# Patient Record
Sex: FEMALE | Race: WHITE | NOT HISPANIC OR LATINO
[De-identification: names, ages, dates, MRNs, and addresses within clinical notes are randomized per-mention and may not be internally consistent; named-entity substitution may affect disease eponyms.]

---

## 2017-03-07 ENCOUNTER — APPOINTMENT (OUTPATIENT)
Dept: ORTHOPEDIC SURGERY | Facility: CLINIC | Age: 72
End: 2017-03-07

## 2017-03-07 VITALS
HEIGHT: 64.5 IN | BODY MASS INDEX: 19.56 KG/M2 | DIASTOLIC BLOOD PRESSURE: 78 MMHG | SYSTOLIC BLOOD PRESSURE: 135 MMHG | HEART RATE: 89 BPM | WEIGHT: 116 LBS

## 2017-03-07 DIAGNOSIS — M16.11 UNILATERAL PRIMARY OSTEOARTHRITIS, RIGHT HIP: ICD-10-CM

## 2017-03-07 RX ORDER — DICLOFENAC SODIUM AND MISOPROSTOL 50; 200 MG/1; UG/1
50-200 TABLET, FILM COATED ORAL
Refills: 0 | Status: ACTIVE | COMMUNITY

## 2017-05-31 ENCOUNTER — INPATIENT (INPATIENT)
Facility: HOSPITAL | Age: 72
LOS: 1 days | Discharge: HOME | DRG: 395 | End: 2017-06-02
Attending: INTERNAL MEDICINE | Admitting: INTERNAL MEDICINE
Payer: COMMERCIAL

## 2017-05-31 VITALS
DIASTOLIC BLOOD PRESSURE: 56 MMHG | RESPIRATION RATE: 16 BRPM | SYSTOLIC BLOOD PRESSURE: 108 MMHG | HEART RATE: 83 BPM | OXYGEN SATURATION: 96 %

## 2017-05-31 LAB
ALBUMIN SERPL ELPH-MCNC: 3.8 G/DL — SIGNIFICANT CHANGE UP (ref 3.3–5)
ALP SERPL-CCNC: 52 U/L — SIGNIFICANT CHANGE UP (ref 40–120)
ALT FLD-CCNC: 24 U/L RC — SIGNIFICANT CHANGE UP (ref 10–45)
ANION GAP SERPL CALC-SCNC: 15 MMOL/L — SIGNIFICANT CHANGE UP (ref 5–17)
APTT BLD: 25.2 SEC — LOW (ref 27.5–37.4)
AST SERPL-CCNC: 31 U/L — SIGNIFICANT CHANGE UP (ref 10–40)
BASE EXCESS BLDV CALC-SCNC: 5.2 MMOL/L — HIGH (ref -2–2)
BASOPHILS # BLD AUTO: 0.1 K/UL — SIGNIFICANT CHANGE UP (ref 0–0.2)
BASOPHILS NFR BLD AUTO: 1.2 % — SIGNIFICANT CHANGE UP (ref 0–2)
BILIRUB SERPL-MCNC: 0.6 MG/DL — SIGNIFICANT CHANGE UP (ref 0.2–1.2)
BUN SERPL-MCNC: 17 MG/DL — SIGNIFICANT CHANGE UP (ref 7–23)
CA-I SERPL-SCNC: 1.17 MMOL/L — SIGNIFICANT CHANGE UP (ref 1.12–1.3)
CALCIUM SERPL-MCNC: 10.1 MG/DL — SIGNIFICANT CHANGE UP (ref 8.4–10.5)
CHLORIDE BLDV-SCNC: 103 MMOL/L — SIGNIFICANT CHANGE UP (ref 96–108)
CHLORIDE SERPL-SCNC: 99 MMOL/L — SIGNIFICANT CHANGE UP (ref 96–108)
CO2 BLDV-SCNC: 30 MMOL/L — SIGNIFICANT CHANGE UP (ref 22–30)
CO2 SERPL-SCNC: 26 MMOL/L — SIGNIFICANT CHANGE UP (ref 22–31)
CREAT SERPL-MCNC: 0.86 MG/DL — SIGNIFICANT CHANGE UP (ref 0.5–1.3)
EOSINOPHIL # BLD AUTO: 0 K/UL — SIGNIFICANT CHANGE UP (ref 0–0.5)
EOSINOPHIL NFR BLD AUTO: 0.1 % — SIGNIFICANT CHANGE UP (ref 0–6)
GAS PNL BLDV: 133 MMOL/L — LOW (ref 136–145)
GAS PNL BLDV: SIGNIFICANT CHANGE UP
GAS PNL BLDV: SIGNIFICANT CHANGE UP
GLUCOSE BLDV-MCNC: 116 MG/DL — HIGH (ref 70–99)
GLUCOSE SERPL-MCNC: 123 MG/DL — HIGH (ref 70–99)
HCO3 BLDV-SCNC: 29 MMOL/L — SIGNIFICANT CHANGE UP (ref 21–29)
HCT VFR BLD CALC: 35.4 % — SIGNIFICANT CHANGE UP (ref 34.5–45)
HCT VFR BLDA CALC: 48 % — SIGNIFICANT CHANGE UP (ref 39–50)
HGB BLD CALC-MCNC: 15.6 G/DL — HIGH (ref 11.5–15.5)
HGB BLD-MCNC: 12.3 G/DL — SIGNIFICANT CHANGE UP (ref 11.5–15.5)
INR BLD: 1.22 RATIO — HIGH (ref 0.88–1.16)
LACTATE BLDV-MCNC: 2.2 MMOL/L — HIGH (ref 0.7–2)
LYMPHOCYTES # BLD AUTO: 0.2 K/UL — LOW (ref 1–3.3)
LYMPHOCYTES # BLD AUTO: 1.8 % — LOW (ref 13–44)
MCHC RBC-ENTMCNC: 33.5 PG — SIGNIFICANT CHANGE UP (ref 27–34)
MCHC RBC-ENTMCNC: 34.8 GM/DL — SIGNIFICANT CHANGE UP (ref 32–36)
MCV RBC AUTO: 96.4 FL — SIGNIFICANT CHANGE UP (ref 80–100)
MONOCYTES # BLD AUTO: 0.8 K/UL — SIGNIFICANT CHANGE UP (ref 0–0.9)
MONOCYTES NFR BLD AUTO: 6.8 % — SIGNIFICANT CHANGE UP (ref 2–14)
NEUTROPHILS # BLD AUTO: 11.1 K/UL — HIGH (ref 1.8–7.4)
NEUTROPHILS NFR BLD AUTO: 90.1 % — HIGH (ref 43–77)
PCO2 BLDV: 42 MMHG — SIGNIFICANT CHANGE UP (ref 35–50)
PH BLDV: 7.46 — HIGH (ref 7.35–7.45)
PLATELET # BLD AUTO: 226 K/UL — SIGNIFICANT CHANGE UP (ref 150–400)
PO2 BLDV: 31 MMHG — SIGNIFICANT CHANGE UP (ref 25–45)
POTASSIUM BLDV-SCNC: 4 MMOL/L — SIGNIFICANT CHANGE UP (ref 3.5–5)
POTASSIUM SERPL-MCNC: 3.8 MMOL/L — SIGNIFICANT CHANGE UP (ref 3.5–5.3)
POTASSIUM SERPL-SCNC: 3.8 MMOL/L — SIGNIFICANT CHANGE UP (ref 3.5–5.3)
PROT SERPL-MCNC: 6.7 G/DL — SIGNIFICANT CHANGE UP (ref 6–8.3)
PROTHROM AB SERPL-ACNC: 13.2 SEC — HIGH (ref 9.8–12.7)
RBC # BLD: 3.67 M/UL — LOW (ref 3.8–5.2)
RBC # FLD: 11.2 % — SIGNIFICANT CHANGE UP (ref 10.3–14.5)
SAO2 % BLDV: 59 % — LOW (ref 67–88)
SODIUM SERPL-SCNC: 140 MMOL/L — SIGNIFICANT CHANGE UP (ref 135–145)
WBC # BLD: 12.3 K/UL — HIGH (ref 3.8–10.5)
WBC # FLD AUTO: 12.3 K/UL — HIGH (ref 3.8–10.5)

## 2017-05-31 PROCEDURE — 93010 ELECTROCARDIOGRAM REPORT: CPT

## 2017-05-31 PROCEDURE — 71020: CPT | Mod: 26

## 2017-05-31 PROCEDURE — 73502 X-RAY EXAM HIP UNI 2-3 VIEWS: CPT | Mod: 26,RT

## 2017-05-31 PROCEDURE — 99285 EMERGENCY DEPT VISIT HI MDM: CPT | Mod: 25

## 2017-05-31 RX ORDER — SODIUM CHLORIDE 9 MG/ML
1000 INJECTION INTRAMUSCULAR; INTRAVENOUS; SUBCUTANEOUS ONCE
Qty: 0 | Refills: 0 | Status: COMPLETED | OUTPATIENT
Start: 2017-05-31 | End: 2017-05-31

## 2017-05-31 RX ORDER — ACETAMINOPHEN 500 MG
1000 TABLET ORAL ONCE
Qty: 0 | Refills: 0 | Status: COMPLETED | OUTPATIENT
Start: 2017-05-31 | End: 2017-06-01

## 2017-05-31 RX ADMIN — SODIUM CHLORIDE 1000 MILLILITER(S): 9 INJECTION INTRAMUSCULAR; INTRAVENOUS; SUBCUTANEOUS at 23:55

## 2017-05-31 NOTE — ED PROVIDER NOTE - ATTENDING CONTRIBUTION TO CARE
Dr. Spicer (Attending Physician)  I performed a history and physical exam of the patient and discussed their management with the advanced care provider. I reviewed the advanced care provider's note and agree with the documented findings and plan of care. My medical decision making and objective findings are found above.

## 2017-05-31 NOTE — ED PROVIDER NOTE - MUSCULOSKELETAL, MLM
Spine appears normal, range of motion is not limited, no muscle or joint tenderness. Right hip with well healing wound.  Good ROM and no tenderness of collections on palpation of the right hip.

## 2017-05-31 NOTE — ED PROVIDER NOTE - PROGRESS NOTE DETAILS
Dr. Constantino called. Sign-out follow-up: CT a/p reports infectious proctitis c/w pt's c/o rectal pain and diarrhea. Will give cipro/flagyl. Findings d/w pt and her Brother who is an Ob/Gyn. Pt Estrellita Constantino called. Dr. Barba Sign-out follow-up: CT a/p reports infectious proctitis c/w pt's c/o rectal pain and diarrhea. Will give cipro/flagyl. Findings d/w pt and her Brother who is an Ob/Gyn. Pt given d/c papers. However this AM Pt feels weak, lightheadedness and has persistent diarrhea. Dr. Constantino paged at [facility]. [facility] transfer center states the hospital is full and there is a waiting list. Pt agreeable to staying at [facility]. FLORINDA Dr. Barba on-call refers admission to Dr. Dre NEELY No callback from Dr. Constantino. His office and [facility] transfer center has paged twice. Spoke with pt's brother at pt regarding situation. They agree to stay at [facility] for the time being. FLORINDA. Sign-out follow-up: CT a/p reports infectious proctitis c/w pt's c/o rectal pain and diarrhea. Will give cipro/flagyl. Findings d/w pt and her Brother who is an Ob/Gyn. Pt given d/c papers. However this AM Pt feels weak, lightheadedness and has persistent diarrhea. Pt' s bother who is an Ob/Gyn is concerned the proctitis may be inflammatory in nature and requests GI consult for colonoscopy to r/o ulcerative colitis. Dr. Constantino paged at [facility]. [facility] transfer center states the hospital is full and there is a waiting list. Pt agreeable to staying at [facility]. FLORINDA Spoke with "diana" [phone] in Dr. Hummel office regarding case. Orthopedist is still in the OR. Contact information for [facility] and inpt team provided. FLORINDA.

## 2017-05-31 NOTE — ED PROVIDER NOTE - OBJECTIVE STATEMENT
Pt. with recent history of right hip replacement at [facility] with Dr. Constantino one week ago had significant constipation no BM x 7 days. Today she had a large initially firm BM and afterwards had fever, chills, and nausea.  Denies abd. pain. Hip pain is improving.  Denies dysuria, flank pain, rashes, ha, neck pain, cough, shortness of breath. Pt. with recent history of right hip replacement at [facility] with Dr. Constantino one week ago had significant constipation no BM x 7 days. Today she had a large initially firm BM and afterwards had fever, chills, and nausea.  Denies abd. pain. Hip pain is improving.  Denies dysuria, flank pain, rashes, ha, neck pain, cough, shortness of breath.  PCP: [phone] Abiel Barba Pt. with recent history of right hip replacement at [facility] with Dr. Constantino one week ago had significant constipation no BM x 7 days. Today she had a large initially firm BM and afterwards had fever, chills, and nausea.  Denies abd. pain. Hip pain is improving.  Denies dysuria, flank pain, rashes, ha, neck pain, cough, shortness of breath.  PCP: [phone] Abiel Ribeiro Viral: [phone] Pt. with recent history of right hip replacement at [facility] with Dr. Constantino one week ago had significant constipation no BM x 7 days. Today she had a large initially firm BM and afterwards had fever, chills, and nausea.  Denies abd. pain. Hip pain is improving.  Denies dysuria, flank pain, rashes, ha, neck pain, cough, shortness of breath.  PCP: [phone] Abiel Stratton (brother, Ob/Gyn at [place]): [phone]

## 2017-05-31 NOTE — ED ADULT NURSE NOTE - OBJECTIVE STATEMENT
70 y/o F, PSH R anterior approach hip replacement on 5/25/17, d/c from [facility] on 4mg Oxy q 6 hours and Colace 3x/day, pt reports she was constipated since coming home from hospital, her last BM had been the morning of surgery, pt reports she only took the Oxy 2x/day after the surgery, then switched to 1x/day Sunday-Tuesday, pt did not take anything for pain today. Pt reports she had intermittent chills, subjective fevers this week but when she took her temp at home it was always normal. Pt reports sudden onset of lower abdominal pain, nausea, chills today, pt felt like she had to make a BM, required a lot of pushing, pt now c/o 8/10 perirectal soreness when sitting and the feeling of "a lump down there." Pt had a large nonbloody soft normal color bowel movement around 1900 tonight after a lot of pushing. Pt felt immediate relief of abd pain but still repots intermittent chills, subjective fever, intermittent nausea. Staples to anterior R hip are clean/dry/intact, well approximated, no drainage, mild redness around staples noted, no streaking/bleeding noted to site. Pt has been using 1 crutch to walk with no difficulty, pt has started PT, using pneumatic compression devices b/l at home 20 hours/day, Visiting nurse came yesterday to the house. Pt denies headache, dizziness, chest pain, palpitations, cough, SOB, abdominal pain, urinary symptoms, weakness at this time. Brother/HCP at bedside, safety maintained.

## 2017-05-31 NOTE — ED PROVIDER NOTE - CARE PLAN
Principal Discharge DX:	Fever, unspecified fever cause Principal Discharge DX:	Fever, unspecified fever cause  Secondary Diagnosis:	Proctitis  Secondary Diagnosis:	Leukocytosis

## 2017-05-31 NOTE — ED ADULT NURSE NOTE - CHPI ED SYMPTOMS NEG
no decreased eating/drinking/no diarrhea/no cough/no abdominal pain/no vomiting/no chills/no rash/no shortness of breath/no headache

## 2017-05-31 NOTE — ED PROVIDER NOTE - MEDICAL DECISION MAKING DETAILS
Dr. Spicer (Attending Physician)  Pt. with right hip replacement one week ago with constipation bm today but now febrile and tachycardic.  No other source of infection on history.  Rectal pain after bm has small hemorrhoid and skin tag, but no perianal abscess.  will check cxr, ua, xray of hip, esr, crp and reassess. Dr. Spicer (Attending Physician)  Pt. with right hip replacement one week ago with constipation bm today but now febrile and tachycardic.  No other source of infection on history.  Rectal pain after bm has small hemorrhoid and skin tag, but no perianal abscess.  will check cxr, ua, xray of hip, esr, crp, consult ortho and reassess.

## 2017-06-01 DIAGNOSIS — R50.9 FEVER, UNSPECIFIED: ICD-10-CM

## 2017-06-01 LAB
ANION GAP SERPL CALC-SCNC: 13 MMOL/L — SIGNIFICANT CHANGE UP (ref 5–17)
APPEARANCE UR: CLEAR — SIGNIFICANT CHANGE UP
BILIRUB UR-MCNC: NEGATIVE — SIGNIFICANT CHANGE UP
BUN SERPL-MCNC: 11 MG/DL — SIGNIFICANT CHANGE UP (ref 7–23)
CALCIUM SERPL-MCNC: 9.5 MG/DL — SIGNIFICANT CHANGE UP (ref 8.4–10.5)
CHLORIDE SERPL-SCNC: 102 MMOL/L — SIGNIFICANT CHANGE UP (ref 96–108)
CO2 SERPL-SCNC: 26 MMOL/L — SIGNIFICANT CHANGE UP (ref 22–31)
COLOR SPEC: YELLOW — SIGNIFICANT CHANGE UP
CREAT SERPL-MCNC: 0.8 MG/DL — SIGNIFICANT CHANGE UP (ref 0.5–1.3)
DIFF PNL FLD: NEGATIVE — SIGNIFICANT CHANGE UP
GLUCOSE SERPL-MCNC: 111 MG/DL — HIGH (ref 70–99)
GLUCOSE UR QL: NEGATIVE — SIGNIFICANT CHANGE UP
HCT VFR BLD CALC: 35 % — SIGNIFICANT CHANGE UP (ref 34.5–45)
HGB BLD-MCNC: 11.6 G/DL — SIGNIFICANT CHANGE UP (ref 11.5–15.5)
KETONES UR-MCNC: ABNORMAL
LEUKOCYTE ESTERASE UR-ACNC: NEGATIVE — SIGNIFICANT CHANGE UP
LIDOCAIN IGE QN: 10 U/L — SIGNIFICANT CHANGE UP (ref 7–60)
MCHC RBC-ENTMCNC: 32.3 PG — SIGNIFICANT CHANGE UP (ref 27–34)
MCHC RBC-ENTMCNC: 33.2 GM/DL — SIGNIFICANT CHANGE UP (ref 32–36)
MCV RBC AUTO: 97.5 FL — SIGNIFICANT CHANGE UP (ref 80–100)
NITRITE UR-MCNC: NEGATIVE — SIGNIFICANT CHANGE UP
PH UR: 6.5 — SIGNIFICANT CHANGE UP (ref 5–8)
PLATELET # BLD AUTO: 219 K/UL — SIGNIFICANT CHANGE UP (ref 150–400)
POTASSIUM SERPL-MCNC: 3.6 MMOL/L — SIGNIFICANT CHANGE UP (ref 3.5–5.3)
POTASSIUM SERPL-SCNC: 3.6 MMOL/L — SIGNIFICANT CHANGE UP (ref 3.5–5.3)
PROT UR-MCNC: NEGATIVE — SIGNIFICANT CHANGE UP
RBC # BLD: 3.59 M/UL — LOW (ref 3.8–5.2)
RBC # FLD: 11.5 % — SIGNIFICANT CHANGE UP (ref 10.3–14.5)
RBC CASTS # UR COMP ASSIST: SIGNIFICANT CHANGE UP /HPF (ref 0–2)
SODIUM SERPL-SCNC: 141 MMOL/L — SIGNIFICANT CHANGE UP (ref 135–145)
SP GR SPEC: 1.01 — SIGNIFICANT CHANGE UP (ref 1.01–1.02)
UROBILINOGEN FLD QL: NEGATIVE — SIGNIFICANT CHANGE UP
WBC # BLD: 14.6 K/UL — HIGH (ref 3.8–10.5)
WBC # FLD AUTO: 14.6 K/UL — HIGH (ref 3.8–10.5)
WBC UR QL: SIGNIFICANT CHANGE UP /HPF (ref 0–5)

## 2017-06-01 PROCEDURE — 99222 1ST HOSP IP/OBS MODERATE 55: CPT

## 2017-06-01 PROCEDURE — 74177 CT ABD & PELVIS W/CONTRAST: CPT | Mod: 26

## 2017-06-01 RX ORDER — CIPROFLOXACIN LACTATE 400MG/40ML
400 VIAL (ML) INTRAVENOUS EVERY 12 HOURS
Qty: 0 | Refills: 0 | Status: DISCONTINUED | OUTPATIENT
Start: 2017-06-01 | End: 2017-06-01

## 2017-06-01 RX ORDER — FAMOTIDINE 10 MG/ML
1 INJECTION INTRAVENOUS
Qty: 0 | Refills: 0 | COMMUNITY

## 2017-06-01 RX ORDER — CIPROFLOXACIN LACTATE 400MG/40ML
1 VIAL (ML) INTRAVENOUS
Qty: 28 | Refills: 0 | OUTPATIENT
Start: 2017-06-01 | End: 2017-06-15

## 2017-06-01 RX ORDER — METRONIDAZOLE 500 MG
500 TABLET ORAL ONCE
Qty: 0 | Refills: 0 | Status: COMPLETED | OUTPATIENT
Start: 2017-06-01 | End: 2017-06-01

## 2017-06-01 RX ORDER — METRONIDAZOLE 500 MG
1 TABLET ORAL
Qty: 42 | Refills: 0 | OUTPATIENT
Start: 2017-06-01 | End: 2017-06-15

## 2017-06-01 RX ORDER — ONDANSETRON 8 MG/1
4 TABLET, FILM COATED ORAL EVERY 8 HOURS
Qty: 0 | Refills: 0 | Status: DISCONTINUED | OUTPATIENT
Start: 2017-06-01 | End: 2017-06-02

## 2017-06-01 RX ORDER — ONDANSETRON 8 MG/1
4 TABLET, FILM COATED ORAL ONCE
Qty: 0 | Refills: 0 | Status: COMPLETED | OUTPATIENT
Start: 2017-06-01 | End: 2017-06-01

## 2017-06-01 RX ORDER — FAMOTIDINE 10 MG/ML
20 INJECTION INTRAVENOUS
Qty: 0 | Refills: 0 | Status: DISCONTINUED | OUTPATIENT
Start: 2017-06-01 | End: 2017-06-02

## 2017-06-01 RX ORDER — CELECOXIB 200 MG/1
200 CAPSULE ORAL
Qty: 0 | Refills: 0 | Status: DISCONTINUED | OUTPATIENT
Start: 2017-06-01 | End: 2017-06-02

## 2017-06-01 RX ORDER — SODIUM CHLORIDE 9 MG/ML
1000 INJECTION INTRAMUSCULAR; INTRAVENOUS; SUBCUTANEOUS
Qty: 0 | Refills: 0 | Status: DISCONTINUED | OUTPATIENT
Start: 2017-06-01 | End: 2017-06-02

## 2017-06-01 RX ORDER — ONDANSETRON 8 MG/1
1 TABLET, FILM COATED ORAL
Qty: 12 | Refills: 0 | OUTPATIENT
Start: 2017-06-01 | End: 2017-06-05

## 2017-06-01 RX ORDER — HYDROCORTISONE 1 %
1 OINTMENT (GRAM) TOPICAL ONCE
Qty: 0 | Refills: 0 | Status: COMPLETED | OUTPATIENT
Start: 2017-06-01 | End: 2017-06-01

## 2017-06-01 RX ORDER — ASPIRIN/CALCIUM CARB/MAGNESIUM 324 MG
81 TABLET ORAL DAILY
Qty: 0 | Refills: 0 | Status: DISCONTINUED | OUTPATIENT
Start: 2017-06-01 | End: 2017-06-02

## 2017-06-01 RX ORDER — DOCUSATE SODIUM 100 MG
1 CAPSULE ORAL
Qty: 0 | Refills: 0 | COMMUNITY

## 2017-06-01 RX ADMIN — Medication 200 MILLIGRAM(S): at 04:51

## 2017-06-01 RX ADMIN — FAMOTIDINE 20 MILLIGRAM(S): 10 INJECTION INTRAVENOUS at 19:07

## 2017-06-01 RX ADMIN — SODIUM CHLORIDE 100 MILLILITER(S): 9 INJECTION INTRAMUSCULAR; INTRAVENOUS; SUBCUTANEOUS at 09:11

## 2017-06-01 RX ADMIN — CELECOXIB 200 MILLIGRAM(S): 200 CAPSULE ORAL at 16:18

## 2017-06-01 RX ADMIN — Medication 100 MILLIGRAM(S): at 04:51

## 2017-06-01 RX ADMIN — Medication 81 MILLIGRAM(S): at 15:35

## 2017-06-01 RX ADMIN — CELECOXIB 200 MILLIGRAM(S): 200 CAPSULE ORAL at 17:15

## 2017-06-01 RX ADMIN — Medication 400 MILLIGRAM(S): at 00:05

## 2017-06-01 RX ADMIN — ONDANSETRON 4 MILLIGRAM(S): 8 TABLET, FILM COATED ORAL at 09:11

## 2017-06-01 RX ADMIN — Medication 1 SUPPOSITORY(S): at 02:19

## 2017-06-01 NOTE — H&P ADULT. - RS GEN PE MLT RESP DETAILS PC
no rales/respirations non-labored/clear to auscultation bilaterally/breath sounds equal/good air movement/airway patent/no rhonchi/no wheezes

## 2017-06-01 NOTE — ED ADULT NURSE REASSESSMENT NOTE - GENERAL PATIENT STATE
cooperative/comfortable appearance/improvement verbalized/family/SO at bedside/resting/sleeping/smiling/interactive

## 2017-06-01 NOTE — H&P ADULT. - HISTORY OF PRESENT ILLNESS
72 yo F with no significant PMH, recent right hip replacement at [facility] with Dr. Constantino one week ago. Pt had significant constipation post-surgery, no BM x 7 days. On 5/31she had a large firm and painful BM and afterwards had fever, chills, and nausea.  Denies abdominal pain but reports rectal pain post BM. Minamal post-surgical hip pain.  Denies dysuria, flank pain, rashes, ha, neck pain, cough, shortness of breath.

## 2017-06-01 NOTE — ED ADULT NURSE REASSESSMENT NOTE - NS ED NURSE REASSESS COMMENT FT1
Pt AAOx4, NAD, resp nonlabored, resting comfortably in bed with brother at bedside. Pt reports improvement in chills, nausea. Pt denies headache, dizziness, chest pain, palpitations, SOB, abd pain, n/v/d, urinary symptoms, fevers, chills, weakness at this time. HR now 98. MD Spicer aware. Pt denies any R hip pain, pt ambulated to bathroom with crutch, steady gait noted. MD performed rectal exam with RN chaperone, small external hemorrhoid noted, pt has discomfort on palpation to rectum. Pt awaiting CT, drinking for scan. Safety maintained. Brother at bedside. Pt AAOx4, NAD, resp nonlabored, resting comfortably in bed with brother at bedside. Pt reports improvement in chills, nausea. Pt denies headache, dizziness, chest pain, palpitations, SOB, abd pain, n/v/d, urinary symptoms, fevers, chills, weakness at this time. HR now 98. MD Spicer aware. Pt denies any R hip pain, pt ambulated to bathroom with crutch, steady gait noted. MD performed rectal exam with RN chaperone, small external hemorrhoid noted, pt has discomfort on palpation to rectum. Pt awaiting CT, drinking for scan. Safety maintained. Brother at bedside.    0100: Ortho MD at bedside for consult. No changes observed.

## 2017-06-01 NOTE — ED ADULT NURSE REASSESSMENT NOTE - NS ED NURSE REASSESS COMMENT FT1
patient is resting in the hallway waiting for iv abx to finish and then patient will be d/c. vss/nad. denies any complaints. will continue to monitor.

## 2017-06-01 NOTE — H&P ADULT. - NEGATIVE ENMT SYMPTOMS
no tinnitus/no throat pain/no vertigo/no sinus symptoms/no nasal congestion/no hearing difficulty/no ear pain

## 2017-06-01 NOTE — H&P ADULT. - ASSESSMENT
72 yo F with recent Rt hip replacement, constipation, rectal pain:  1. Fever, chills, nausea - likely 2/2 proctitis, unlikely related to hip replacement as per Ortho.  2. Proctitis - likely inflammatory due to constipation, GI eval (rectal exam deferred until GI eval), no antibiotics at present as per ID, will keep NPO until cleared for diet by GI (possibility of colonoscopy), IVF, pain medications, bowel regimen as per GI, stool culture.  3. Recent hip replacement - no acute intervention as per Ortho.  4. DVT prophylaxis - Lovenox.

## 2017-06-02 VITALS
OXYGEN SATURATION: 96 % | RESPIRATION RATE: 18 BRPM | DIASTOLIC BLOOD PRESSURE: 71 MMHG | TEMPERATURE: 105 F | SYSTOLIC BLOOD PRESSURE: 105 MMHG | HEART RATE: 75 BPM

## 2017-06-02 LAB
ANION GAP SERPL CALC-SCNC: 10 MMOL/L — SIGNIFICANT CHANGE UP (ref 5–17)
BUN SERPL-MCNC: 9 MG/DL — SIGNIFICANT CHANGE UP (ref 7–23)
CALCIUM SERPL-MCNC: 8.9 MG/DL — SIGNIFICANT CHANGE UP (ref 8.4–10.5)
CHLORIDE SERPL-SCNC: 110 MMOL/L — HIGH (ref 96–108)
CO2 SERPL-SCNC: 24 MMOL/L — SIGNIFICANT CHANGE UP (ref 22–31)
CREAT SERPL-MCNC: 0.8 MG/DL — SIGNIFICANT CHANGE UP (ref 0.5–1.3)
CULTURE RESULTS: NO GROWTH — SIGNIFICANT CHANGE UP
GLUCOSE SERPL-MCNC: 97 MG/DL — SIGNIFICANT CHANGE UP (ref 70–99)
HCT VFR BLD CALC: 32.1 % — LOW (ref 34.5–45)
HGB BLD-MCNC: 10.2 G/DL — LOW (ref 11.5–15.5)
MCHC RBC-ENTMCNC: 30.9 PG — SIGNIFICANT CHANGE UP (ref 27–34)
MCHC RBC-ENTMCNC: 31.8 GM/DL — LOW (ref 32–36)
MCV RBC AUTO: 97.3 FL — SIGNIFICANT CHANGE UP (ref 80–100)
PLATELET # BLD AUTO: 217 K/UL — SIGNIFICANT CHANGE UP (ref 150–400)
POTASSIUM SERPL-MCNC: 3.9 MMOL/L — SIGNIFICANT CHANGE UP (ref 3.5–5.3)
POTASSIUM SERPL-SCNC: 3.9 MMOL/L — SIGNIFICANT CHANGE UP (ref 3.5–5.3)
RBC # BLD: 3.3 M/UL — LOW (ref 3.8–5.2)
RBC # FLD: 13.4 % — SIGNIFICANT CHANGE UP (ref 10.3–14.5)
SODIUM SERPL-SCNC: 144 MMOL/L — SIGNIFICANT CHANGE UP (ref 135–145)
SPECIMEN SOURCE: SIGNIFICANT CHANGE UP
WBC # BLD: 9.08 K/UL — SIGNIFICANT CHANGE UP (ref 3.8–10.5)
WBC # FLD AUTO: 9.08 K/UL — SIGNIFICANT CHANGE UP (ref 3.8–10.5)

## 2017-06-02 PROCEDURE — 86140 C-REACTIVE PROTEIN: CPT

## 2017-06-02 PROCEDURE — 96374 THER/PROPH/DIAG INJ IV PUSH: CPT | Mod: XU

## 2017-06-02 PROCEDURE — 87086 URINE CULTURE/COLONY COUNT: CPT

## 2017-06-02 PROCEDURE — 85014 HEMATOCRIT: CPT

## 2017-06-02 PROCEDURE — 87046 STOOL CULTR AEROBIC BACT EA: CPT

## 2017-06-02 PROCEDURE — 71046 X-RAY EXAM CHEST 2 VIEWS: CPT

## 2017-06-02 PROCEDURE — 82947 ASSAY GLUCOSE BLOOD QUANT: CPT

## 2017-06-02 PROCEDURE — 99285 EMERGENCY DEPT VISIT HI MDM: CPT | Mod: 25

## 2017-06-02 PROCEDURE — 93005 ELECTROCARDIOGRAM TRACING: CPT

## 2017-06-02 PROCEDURE — 80053 COMPREHEN METABOLIC PANEL: CPT

## 2017-06-02 PROCEDURE — 72170 X-RAY EXAM OF PELVIS: CPT

## 2017-06-02 PROCEDURE — 85652 RBC SED RATE AUTOMATED: CPT

## 2017-06-02 PROCEDURE — 87040 BLOOD CULTURE FOR BACTERIA: CPT

## 2017-06-02 PROCEDURE — 87045 FECES CULTURE AEROBIC BACT: CPT

## 2017-06-02 PROCEDURE — 82330 ASSAY OF CALCIUM: CPT

## 2017-06-02 PROCEDURE — 84132 ASSAY OF SERUM POTASSIUM: CPT

## 2017-06-02 PROCEDURE — 85730 THROMBOPLASTIN TIME PARTIAL: CPT

## 2017-06-02 PROCEDURE — 97161 PT EVAL LOW COMPLEX 20 MIN: CPT

## 2017-06-02 PROCEDURE — 85027 COMPLETE CBC AUTOMATED: CPT

## 2017-06-02 PROCEDURE — 99232 SBSQ HOSP IP/OBS MODERATE 35: CPT

## 2017-06-02 PROCEDURE — 74177 CT ABD & PELVIS W/CONTRAST: CPT

## 2017-06-02 PROCEDURE — 80048 BASIC METABOLIC PNL TOTAL CA: CPT

## 2017-06-02 PROCEDURE — 73502 X-RAY EXAM HIP UNI 2-3 VIEWS: CPT

## 2017-06-02 PROCEDURE — 82435 ASSAY OF BLOOD CHLORIDE: CPT

## 2017-06-02 PROCEDURE — 84295 ASSAY OF SERUM SODIUM: CPT

## 2017-06-02 PROCEDURE — 81001 URINALYSIS AUTO W/SCOPE: CPT

## 2017-06-02 PROCEDURE — 96375 TX/PRO/DX INJ NEW DRUG ADDON: CPT

## 2017-06-02 PROCEDURE — 83605 ASSAY OF LACTIC ACID: CPT

## 2017-06-02 PROCEDURE — 83690 ASSAY OF LIPASE: CPT

## 2017-06-02 PROCEDURE — 82803 BLOOD GASES ANY COMBINATION: CPT

## 2017-06-02 PROCEDURE — 85610 PROTHROMBIN TIME: CPT

## 2017-06-02 RX ORDER — ASPIRIN/CALCIUM CARB/MAGNESIUM 324 MG
1 TABLET ORAL
Qty: 0 | Refills: 0 | COMMUNITY

## 2017-06-02 RX ORDER — ASPIRIN/CALCIUM CARB/MAGNESIUM 324 MG
1 TABLET ORAL
Qty: 0 | Refills: 0 | COMMUNITY
Start: 2017-06-02

## 2017-06-02 RX ORDER — CELECOXIB 200 MG/1
1 CAPSULE ORAL
Qty: 0 | Refills: 0 | COMMUNITY

## 2017-06-02 RX ORDER — POLYETHYLENE GLYCOL 3350 17 G/17G
17 POWDER, FOR SOLUTION ORAL
Qty: 0 | Refills: 0 | COMMUNITY

## 2017-06-02 RX ORDER — CELECOXIB 200 MG/1
1 CAPSULE ORAL
Qty: 0 | Refills: 0 | COMMUNITY
Start: 2017-06-02

## 2017-06-02 RX ORDER — DOCUSATE SODIUM 100 MG
100 CAPSULE ORAL THREE TIMES A DAY
Qty: 0 | Refills: 0 | Status: DISCONTINUED | OUTPATIENT
Start: 2017-06-02 | End: 2017-06-02

## 2017-06-02 RX ADMIN — Medication 100 MILLIGRAM(S): at 11:45

## 2017-06-02 RX ADMIN — SODIUM CHLORIDE 100 MILLILITER(S): 9 INJECTION INTRAMUSCULAR; INTRAVENOUS; SUBCUTANEOUS at 08:35

## 2017-06-02 RX ADMIN — CELECOXIB 200 MILLIGRAM(S): 200 CAPSULE ORAL at 12:10

## 2017-06-02 RX ADMIN — FAMOTIDINE 20 MILLIGRAM(S): 10 INJECTION INTRAVENOUS at 05:18

## 2017-06-02 RX ADMIN — SODIUM CHLORIDE 100 MILLILITER(S): 9 INJECTION INTRAMUSCULAR; INTRAVENOUS; SUBCUTANEOUS at 05:18

## 2017-06-02 RX ADMIN — CELECOXIB 200 MILLIGRAM(S): 200 CAPSULE ORAL at 11:41

## 2017-06-02 RX ADMIN — Medication 81 MILLIGRAM(S): at 11:41

## 2017-06-02 NOTE — DISCHARGE NOTE ADULT - PLAN OF CARE
NO fever Fever secondary to proctitis  Monitor for further fevers YOu have inflammation of rectum secondary to constipation;  Avoid constipation  Follow up with GI for colonoscopy EAt more fiber    Continue laxatives especially if you are on pain medications Soak your buttock in warm sitz bath 2x daily Follow up with Orthopedic as soon as possible

## 2017-06-02 NOTE — PHYSICAL THERAPY INITIAL EVALUATION ADULT - PERTINENT HX OF CURRENT PROBLEM, REHAB EVAL
70 yo F with no significant PMH, recent right hip replacement at [facility] with Dr. Constantino one week ago. Pt had significant constipation post-surgery, no BM x 7 days. On 5/31she had a large firm and painful BM and afterwards had fever, chills, and nausea.  Denies abdominal pain but reports rectal pain post BM. Minimal post-surgical hip pain.

## 2017-06-02 NOTE — PHYSICAL THERAPY INITIAL EVALUATION ADULT - GENERAL OBSERVATIONS, REHAB EVAL
Pt received supine in bed in NAD, VSS, + UE IV lock, R hip ant incision C/D/I, staples intact, compression device on joleen LEs (pt independent in connecting and disconnecting).

## 2017-06-02 NOTE — PHYSICAL THERAPY INITIAL EVALUATION ADULT - ADDITIONAL COMMENTS
Pt lives in a private house with 3 steps to enter, + handrail and 1 flight of stairs to bedroom but pt has been residing on the main floor.

## 2017-06-02 NOTE — DISCHARGE NOTE ADULT - MEDICATION SUMMARY - MEDICATIONS TO TAKE
I will START or STAY ON the medications listed below when I get home from the hospital:    oxycodone-acetaminophen 5mg-325mg oral tablet  -- 1 tab(s) by mouth every 6 hours  -- Indication: For Pain    celecoxib 200 mg oral capsule  -- 1 cap(s) by mouth once a day (before a meal)  -- Indication: For NSAID for pain    aspirin 81 mg oral delayed release tablet  -- 1 tab(s) by mouth once a day  -- Indication: For Primary prevention    famotidine 20 mg oral tablet  -- 1 tab(s) by mouth 2 times a day  -- Indication: For gerd    docusate sodium 100 mg oral capsule  -- 1 cap(s) by mouth 3 times a day  -- Indication: For constipation    MiraLax oral powder for reconstitution  -- 17 gram(s) by mouth once a day  -- Indication: For constipation

## 2017-06-02 NOTE — DISCHARGE NOTE ADULT - CARE PLAN
Principal Discharge DX:	Fever  Goal:	NO fever  Instructions for follow-up, activity and diet:	Fever secondary to proctitis  Monitor for further fevers  Secondary Diagnosis:	Proctitis  Instructions for follow-up, activity and diet:	YOu have inflammation of rectum secondary to constipation;  Avoid constipation  Follow up with GI for colonoscopy  Secondary Diagnosis:	Constipation  Instructions for follow-up, activity and diet:	EAt more fiber    Continue laxatives especially if you are on pain medications  Secondary Diagnosis:	Anal fissure  Instructions for follow-up, activity and diet:	Soak your buttock in warm sitz bath 2x daily  Secondary Diagnosis:	S/P hip replacement, right  Instructions for follow-up, activity and diet:	Follow up with Orthopedic as soon as possible

## 2017-06-02 NOTE — DISCHARGE NOTE ADULT - HOSPITAL COURSE
to be completed by attending 71 Yrs old female PMX of recent history of right hip replacement at [facility] with Dr. Constantino one week ago had significant constipation no BM x 7 days. Today she had a large initially firm BM and afterwards had fever, chills, and nausea.  Denies abd. pain. Hip pain is improving.  Denies dysuria, flank pain,  - Procttitis on CT abdomen, non-infectious as per ID and GI. Anal fissure diagnosed by GI, pt to be d/c home and to continue with Colace, colonoscopy as outpatient.

## 2017-06-02 NOTE — DISCHARGE NOTE ADULT - PATIENT PORTAL LINK FT
“You can access the Follow[facility] Patient Portal, offered by [facility], by registering with the following website: [email or web address]”

## 2017-06-02 NOTE — PHYSICAL THERAPY INITIAL EVALUATION ADULT - REFERRING PHYSICIAN, REHAB EVAL
PT Orders: PT Evaluate and treat PT Orders: PT Evaluate and treat; OOB with assistance (spoek with attending MD and MD to enter order in EMR)

## 2017-06-02 NOTE — DISCHARGE NOTE ADULT - CARE PROVIDER_API CALL
Adam Mariscal (DO), Gastroenterology; Internal Medicine  [address] [place] [address]  Phone: [phone]  Fax: [phone]    Ilene MOONEY  Phone: (   )    -  Fax: (   )    -

## 2017-06-03 LAB
CULTURE RESULTS: SIGNIFICANT CHANGE UP
SPECIMEN SOURCE: SIGNIFICANT CHANGE UP

## 2017-06-06 LAB
CULTURE RESULTS: SIGNIFICANT CHANGE UP
CULTURE RESULTS: SIGNIFICANT CHANGE UP
SPECIMEN SOURCE: SIGNIFICANT CHANGE UP
SPECIMEN SOURCE: SIGNIFICANT CHANGE UP

## 2018-04-02 ENCOUNTER — EMERGENCY (EMERGENCY)
Facility: HOSPITAL | Age: 73
LOS: 1 days | Discharge: HOME | End: 2018-04-02
Attending: EMERGENCY MEDICINE | Admitting: EMERGENCY MEDICINE
Payer: COMMERCIAL

## 2018-04-02 ENCOUNTER — TRANSCRIPTION ENCOUNTER (OUTPATIENT)
Age: 73
End: 2018-04-02

## 2018-04-02 VITALS
TEMPERATURE: 99 F | RESPIRATION RATE: 16 BRPM | HEART RATE: 98 BPM | DIASTOLIC BLOOD PRESSURE: 80 MMHG | OXYGEN SATURATION: 98 % | SYSTOLIC BLOOD PRESSURE: 132 MMHG

## 2018-04-02 VITALS
SYSTOLIC BLOOD PRESSURE: 158 MMHG | TEMPERATURE: 99 F | RESPIRATION RATE: 20 BRPM | HEART RATE: 106 BPM | DIASTOLIC BLOOD PRESSURE: 89 MMHG | HEIGHT: 65 IN | OXYGEN SATURATION: 98 % | WEIGHT: 117.95 LBS

## 2018-04-02 LAB
ALBUMIN SERPL ELPH-MCNC: 4.4 G/DL — SIGNIFICANT CHANGE UP (ref 3.3–5)
ALP SERPL-CCNC: 71 U/L — SIGNIFICANT CHANGE UP (ref 40–120)
ALT FLD-CCNC: 25 U/L RC — SIGNIFICANT CHANGE UP (ref 10–45)
ANION GAP SERPL CALC-SCNC: 10 MMOL/L — SIGNIFICANT CHANGE UP (ref 5–17)
APTT BLD: 27.9 SEC — SIGNIFICANT CHANGE UP (ref 27.5–37.4)
AST SERPL-CCNC: 32 U/L — SIGNIFICANT CHANGE UP (ref 10–40)
BILIRUB SERPL-MCNC: 0.5 MG/DL — SIGNIFICANT CHANGE UP (ref 0.2–1.2)
BUN SERPL-MCNC: 14 MG/DL — SIGNIFICANT CHANGE UP (ref 7–23)
CALCIUM SERPL-MCNC: 10.9 MG/DL — HIGH (ref 8.4–10.5)
CHLORIDE SERPL-SCNC: 100 MMOL/L — SIGNIFICANT CHANGE UP (ref 96–108)
CO2 SERPL-SCNC: 29 MMOL/L — SIGNIFICANT CHANGE UP (ref 22–31)
CREAT SERPL-MCNC: 0.95 MG/DL — SIGNIFICANT CHANGE UP (ref 0.5–1.3)
GLUCOSE SERPL-MCNC: 110 MG/DL — HIGH (ref 70–99)
HCT VFR BLD CALC: 45.3 % — HIGH (ref 34.5–45)
HGB BLD-MCNC: 15.3 G/DL — SIGNIFICANT CHANGE UP (ref 11.5–15.5)
INR BLD: 1.15 RATIO — SIGNIFICANT CHANGE UP (ref 0.88–1.16)
MCHC RBC-ENTMCNC: 32.9 PG — SIGNIFICANT CHANGE UP (ref 27–34)
MCHC RBC-ENTMCNC: 33.8 GM/DL — SIGNIFICANT CHANGE UP (ref 32–36)
MCV RBC AUTO: 97.2 FL — SIGNIFICANT CHANGE UP (ref 80–100)
PLATELET # BLD AUTO: 171 K/UL — SIGNIFICANT CHANGE UP (ref 150–400)
POTASSIUM SERPL-MCNC: 4.1 MMOL/L — SIGNIFICANT CHANGE UP (ref 3.5–5.3)
POTASSIUM SERPL-SCNC: 4.1 MMOL/L — SIGNIFICANT CHANGE UP (ref 3.5–5.3)
PROT SERPL-MCNC: 7.7 G/DL — SIGNIFICANT CHANGE UP (ref 6–8.3)
PROTHROM AB SERPL-ACNC: 12.6 SEC — SIGNIFICANT CHANGE UP (ref 9.8–12.7)
RBC # BLD: 4.66 M/UL — SIGNIFICANT CHANGE UP (ref 3.8–5.2)
RBC # FLD: 11.3 % — SIGNIFICANT CHANGE UP (ref 10.3–14.5)
SODIUM SERPL-SCNC: 139 MMOL/L — SIGNIFICANT CHANGE UP (ref 135–145)
WBC # BLD: 8.9 K/UL — SIGNIFICANT CHANGE UP (ref 3.8–10.5)
WBC # FLD AUTO: 8.9 K/UL — SIGNIFICANT CHANGE UP (ref 3.8–10.5)

## 2018-04-02 PROCEDURE — 74177 CT ABD & PELVIS W/CONTRAST: CPT

## 2018-04-02 PROCEDURE — 71260 CT THORAX DX C+: CPT | Mod: 26

## 2018-04-02 PROCEDURE — 74177 CT ABD & PELVIS W/CONTRAST: CPT | Mod: 26

## 2018-04-02 PROCEDURE — 99281 EMR DPT VST MAYX REQ PHY/QHP: CPT

## 2018-04-02 PROCEDURE — 99284 EMERGENCY DEPT VISIT MOD MDM: CPT

## 2018-04-02 PROCEDURE — 80053 COMPREHEN METABOLIC PANEL: CPT

## 2018-04-02 PROCEDURE — 85610 PROTHROMBIN TIME: CPT

## 2018-04-02 PROCEDURE — 71260 CT THORAX DX C+: CPT

## 2018-04-02 PROCEDURE — 85730 THROMBOPLASTIN TIME PARTIAL: CPT

## 2018-04-02 PROCEDURE — 85027 COMPLETE CBC AUTOMATED: CPT
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
## 2018-04-02 NOTE — ED ADULT NURSE REASSESSMENT NOTE - NS ED NURSE REASSESS COMMENT FT1
Patient given incentive spirometer and educated on how to use. Patient demonstrated how to use incentive spirometer properly.

## 2018-04-02 NOTE — ED PROVIDER NOTE - PLAN OF CARE
Use incentive spirometer for 10 minutes every hour  Take ibuprofen 600 mg every 8 hours as needed for pain with food and plenty of water  Rest, ice or heat packs as needed for discomfort.  Follow up with your primary doctor within the next 1-2 days  If you have any worsening or changing symptoms such as confusion, loss of consciousness, worsening pain, nausea/vomiting, or if you have any other concerns please return to the emergency department
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
## 2018-04-02 NOTE — ED PROVIDER NOTE - CARE PLAN
Principal Discharge DX:	Closed fracture of multiple ribs of right side, initial encounter  Assessment and plan of treatment:	Use incentive spirometer for 10 minutes every hour  Take ibuprofen 600 mg every 8 hours as needed for pain with food and plenty of water  Rest, ice or heat packs as needed for discomfort.  Follow up with your primary doctor within the next 1-2 days  If you have any worsening or changing symptoms such as confusion, loss of consciousness, worsening pain, nausea/vomiting, or if you have any other concerns please return to the emergency department

## 2018-04-02 NOTE — ED PROVIDER NOTE - NS_ ATTENDINGSCRIBEDETAILS _ED_A_ED_FT
The scribe's documentation has been prepared under my direction and personally reviewed by me in its entirety. I confirm that the note above accurately reflects all work, treatment, procedures, and medical decision-making performed by me.   -[Gerald Sparrow MD]

## 2018-04-02 NOTE — ED PROVIDER NOTE - MEDICAL DECISION MAKING DETAILS
72 year old female with no pmhx refer to the ED from  after XR performed and demonstrated 10th rib fracture which was sustained after a fall after slipping on ice earlier today. No trauma or LOC. On physical exam, VSS, lungs are clear to ausculation bilaterally. Patient in no acute distress. Exam is remarkable for right lower rib pain and RUQ abdominal tenderness to palpation. No other gross abnormalities. CT ordered for intrathoracic or intraabdominal pathology. Patient described no pain at this moment. Will reassess, following completion /interpretation from imaging studies.

## 2018-04-02 NOTE — ED ADULT NURSE REASSESSMENT NOTE - NS ED NURSE REASSESS COMMENT FT1
Patient reassessed; states she has some pain on the right sied but does not want any pain medication at this time; 20 g IV in R AC patent and site WNL. Spouse at the bedside. Will continue to monitor and patient safety maintained.
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
## 2018-04-02 NOTE — ED PROVIDER NOTE - MUSCULOSKELETAL, MLM
Spine appears normal with normal alignment, range of motion is not limited, no muscle or joint tenderness

## 2018-04-03 NOTE — CONSULT NOTE ADULT - CONSULT REQUESTED DATE/TIME
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    Patient seen by provider today: No    Note: Mr. Bruno reports doing fair today. He continues to have exasperated GERD symptoms and nausea with intermittent hiccups. His appetite is decreased but he states he is doing his best to eat and drink fluids.     Intravenous Access:  Peripheral IV placed.    Treatment Conditions:  Not Applicable.    Post Infusion Assessment:  Patient tolerated infusion without incident.  Blood return noted pre and post infusion.  Site patent and intact, free from redness, edema or discomfort.  No evidence of extravasations.  Access discontinued per protocol.    Discharge Plan:   Patient refills given for Ativan.  AVS to patient via [employer].  Patient will return 10/23 for next appointment. Patient will call triage if he feels he needs additional IV fluids later this week.   Patient discharged in stable condition accompanied by: friend.  Departure Mode: Ambulatory.    Ginette Hooks RN                              
03-Apr-2018 00:30
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
## 2018-04-03 NOTE — CONSULT NOTE ADULT - SUBJECTIVE AND OBJECTIVE BOX
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 Ambultory at the scene, no LOC, went to work in [place], on the way home from work stopped at urgent care and was told to be evaluated in ED concern for rib fracture. Patient asymptomatic. Ambulatory, denies SOB. Was able to lift 83lb border collie at home today without difficulty.  10 pt ROS negative.  Refusing pain medications presently.     IS: 1250cc      MEDICAL & SURGICAL HISTORY:  Cataracts  Aesthetic   Hip  Shoulder dislocation   FAMILY HISTORY: Noncontributory  SOCIAL HISTORY: OCcasional ETOH/no tobacco    MEDICATIONS: ASA 81mg     Allergies    eggs (Unknown)  fish (Unknown)  No Known Drug Allergies  Poultry (Anaphylaxis)    Intolerances        Vital Signs Last 24 Hrs  T(C): 37 (02 Apr 2018 23:02), Max: 37.1 (02 Apr 2018 17:01)  T(F): 98.6 (02 Apr 2018 23:02), Max: 98.7 (02 Apr 2018 17:01)  HR: 98 (02 Apr 2018 23:02) (98 - 106)  BP: 132/80 (02 Apr 2018 23:02) (132/80 - 158/89)  BP(mean): --  RR: 16 (02 Apr 2018 23:02) (16 - 20)  SpO2: 98% (02 Apr 2018 23:02) (98% - 100%)  Daily Height in cm: 165.1 (02 Apr 2018 17:01)    Daily     General: WN/WD NAD  Neurology: A&Ox3, nonfocal, DIAL x 4  Head:  Normocephalic, atraumatic  ENT:  Mucosa moist, no ulcerations  Neck:  Supple, no sinuses or palpable masses  Lymphatic:  No palpable cervical, supraclavicular, axillary or inguinal adenopathy  Respiratory: Nonlabored, right lateral chest wall tenderness.   CV: RRR, S1S2, no murmur  Abdominal: Soft, NT, ND no palpable mass  MSK: No edema, + peripheral pulses, FROM all 4 extremity  Incisions: intact, no erythema or drainage                          15.3   8.9   )-----------( 171      ( 02 Apr 2018 19:55 )             45.3     04-02    139  |  100  |  14  ----------------------------<  110<H>  4.1   |  29  |  0.95    Ca    10.9<H>      02 Apr 2018 19:55    TPro  7.7  /  Alb  4.4  /  TBili  0.5  /  DBili  x   /  AST  32  /  ALT  25  /  AlkPhos  71  04-02    PT/INR - ( 02 Apr 2018 19:55 )   PT: 12.6 sec;   INR: 1.15 ratio         PTT - ( 02 Apr 2018 19:55 )  PTT:27.9 sec      Radiographic Findings:     < from: CT Abdomen and Pelvis w/ IV Cont (04.02.18 @ 21:34) >    EXAM:  CT ABDOMEN AND PELVIS IC                          EXAM:  CT CHEST IC                            PROCEDURE DATE:  04/02/2018            INTERPRETATION:  CLINICAL INFORMATION: Right chest pain. Status post fall   with suspected right-sided ribfractures.    COMPARISON: CT abdomen and pelvis 6/1/2017. Chest radiograph 5/31/2017.    PROCEDURE:   CT of the Chest, Abdomen and Pelvis was performed with intravenous   contrast.   Intravenous contrast: 90 ml Omnipaque 350. 10 ml discarded.  Oral contrast: None.  Sagittal and coronal reformats were performed.    FINDINGS:    CHEST:     LUNGS AND LARGE AIRWAYS: Patent central airways. No focal consolidation.   Subsegmental bibasilar atelectasis.  PLEURA: No pleural effusion or pneumothorax. Mildbiapical pleural   thickening.  VESSELS: No contrast extravasation or perivascular hematoma.  HEART: Normal heart size. No pericardial effusion. Coronary artery   calcification.  MEDIASTINUM AND JOSE MARTIN: No lymphadenopathy.  CHEST WALL AND LOWER NECK: Heterogeneous thyroid gland with hypodense   foci measuring up 0.8 x 0.8 cm in the right lobe.     ABDOMEN AND PELVIS:    LIVER: Subcentimeter right hepatic hypodense focus, too small to   characterize.  BILE DUCTS: Normal caliber.  GALLBLADDER: No radiopaque gallstone.  SPLEEN: Within normal limits.  PANCREAS: Within normal limits.    ADRENALS: Unremarkable.  KIDNEYS/URETERS: No hydronephrosis, hydroureter or perinephric stranding.   BLADDER: Difficult to assess due to artifact from the right hip   prosthesis.  REPRODUCTIVE ORGANS: Difficult to assess due to artifact from the right   hip prosthesis. Myomatous uterus. No obvious adnexal mass.    BOWEL: No bowel obstruction. The appendix not visualized. No secondary   signs of appendicitis. Colon diverticulosis. No significant bowel wall   thickening or inflammatory change.  PERITONEUM: No drainable fluid collection or free air.  VESSELS: Atherosclerotic change of the abdominal aorta and its branches.   No contrast extravasation or perivascular hematoma.  RETROPERITONEUM: No lymphadenopathy.    ABDOMINAL WALL: Mild edema in the right upper anterior abdominal wall   soft tissue adjacent to the rib fractures.    BONES: Osteopenia. Minimally displaced fractures of the right 8th and 9th   anterior ribs near the costochondral junction. Displaced fracture of the   right 10th anterior rib fracture near the costochondral junction.   Age-indeterminate, mild T8 anterior wedging, which were present on   5/31/2017 chest radiograph and is likely chronic. Grade 1 anterolisthesis   of L4 on L5. Status post right total hip arthroplasty with artifact from   the prosthesis degrading images. Moderate left hip osteoarthrosis with   subchondral cystic change in the left acetabulum. Stable lucency at the   right acetabulum, which may represent subchondral cystic change although   particle disease cannot be excluded.    IMPRESSION:     Acute, displaced fractures of the right 8th through 10th anterior rib   fractures as described. No pneumothorax.     No acute intrathoracic, intra-abdominal or pelvic solid organ, visceral   or vascular injury.     Heterogeneous thyroid gland, which can be further characterized on a   nonemergent ultrasound.    Additional findings as described.                JORDON CHAVIRA M.D., RADIOLOGY RESIDENT  This document has been electronically signed.  SELVIN CONRDA M.D., ATTENDING RADIOLOGIST  This document has been electronically signed. Apr  3 2018 12:13AM                < end of copied text >        Assessment:

## 2018-04-03 NOTE — CONSULT NOTE ADULT - ATTENDING COMMENTS
Pt is a 72 year old female s/p fall with rib fx  Pain is easily controlled with multimodal pain control  VC >1L on inc. spir  Pt requesting to go home.  Pt education performed.    Discharge in process.

## 2018-04-27 ENCOUNTER — OUTPATIENT (OUTPATIENT)
Dept: OUTPATIENT SERVICES | Facility: HOSPITAL | Age: 73
LOS: 1 days | End: 2018-04-27
Payer: COMMERCIAL

## 2018-04-27 ENCOUNTER — APPOINTMENT (OUTPATIENT)
Dept: ULTRASOUND IMAGING | Facility: CLINIC | Age: 73
End: 2018-04-27
Payer: COMMERCIAL

## 2018-04-27 DIAGNOSIS — Z00.8 ENCOUNTER FOR OTHER GENERAL EXAMINATION: ICD-10-CM

## 2018-04-27 PROCEDURE — 76536 US EXAM OF HEAD AND NECK: CPT

## 2018-04-27 PROCEDURE — 76536 US EXAM OF HEAD AND NECK: CPT | Mod: 26

## 2018-07-23 PROBLEM — M16.11 PRIMARY LOCALIZED OSTEOARTHROSIS OF PELVIC REGION, RIGHT: Status: ACTIVE | Noted: 2017-03-07

## 2018-08-20 ENCOUNTER — APPOINTMENT (OUTPATIENT)
Dept: GASTROENTEROLOGY | Facility: CLINIC | Age: 73
End: 2018-08-20
Payer: COMMERCIAL

## 2018-08-20 VITALS
DIASTOLIC BLOOD PRESSURE: 80 MMHG | WEIGHT: 118 LBS | TEMPERATURE: 98 F | SYSTOLIC BLOOD PRESSURE: 135 MMHG | HEART RATE: 98 BPM | BODY MASS INDEX: 19.9 KG/M2 | HEIGHT: 64.5 IN | RESPIRATION RATE: 17 BRPM | OXYGEN SATURATION: 98 %

## 2018-08-20 DIAGNOSIS — R14.2 ERUCTATION: ICD-10-CM

## 2018-08-20 DIAGNOSIS — Z12.11 ENCOUNTER FOR SCREENING FOR MALIGNANT NEOPLASM OF COLON: ICD-10-CM

## 2018-08-20 DIAGNOSIS — R07.81 PLEURODYNIA: ICD-10-CM

## 2018-08-20 DIAGNOSIS — K31.89 OTHER DISEASES OF STOMACH AND DUODENUM: ICD-10-CM

## 2018-08-20 DIAGNOSIS — R10.11 RIGHT UPPER QUADRANT PAIN: ICD-10-CM

## 2018-08-20 DIAGNOSIS — Z80.9 FAMILY HISTORY OF MALIGNANT NEOPLASM, UNSPECIFIED: ICD-10-CM

## 2018-08-20 PROCEDURE — 99244 OFF/OP CNSLTJ NEW/EST MOD 40: CPT

## 2018-08-20 RX ORDER — SACCHAROMYCES BOULARDII 50 MG
250 CAPSULE ORAL
Qty: 30 | Refills: 3 | Status: ACTIVE | COMMUNITY
Start: 2018-08-20

## 2018-08-20 RX ORDER — SIMETHICONE 250 MG/1
250 CAPSULE, GELATIN COATED ORAL
Qty: 90 | Refills: 3 | Status: ACTIVE | OUTPATIENT
Start: 2018-08-20

## 2018-09-06 ENCOUNTER — FORM ENCOUNTER (OUTPATIENT)
Age: 73
End: 2018-09-06

## 2018-09-07 ENCOUNTER — APPOINTMENT (OUTPATIENT)
Dept: ULTRASOUND IMAGING | Facility: CLINIC | Age: 73
End: 2018-09-07
Payer: COMMERCIAL

## 2018-09-07 ENCOUNTER — OUTPATIENT (OUTPATIENT)
Dept: OUTPATIENT SERVICES | Facility: HOSPITAL | Age: 73
LOS: 1 days | End: 2018-09-07
Payer: COMMERCIAL

## 2018-09-07 DIAGNOSIS — Z00.8 ENCOUNTER FOR OTHER GENERAL EXAMINATION: ICD-10-CM

## 2018-09-07 PROCEDURE — 76700 US EXAM ABDOM COMPLETE: CPT

## 2018-09-07 PROCEDURE — 76700 US EXAM ABDOM COMPLETE: CPT | Mod: 26

## 2018-10-12 ENCOUNTER — APPOINTMENT (OUTPATIENT)
Dept: ULTRASOUND IMAGING | Facility: CLINIC | Age: 73
End: 2018-10-12
Payer: COMMERCIAL

## 2018-10-12 ENCOUNTER — OUTPATIENT (OUTPATIENT)
Dept: OUTPATIENT SERVICES | Facility: HOSPITAL | Age: 73
LOS: 1 days | End: 2018-10-12
Payer: COMMERCIAL

## 2018-10-12 DIAGNOSIS — Z00.8 ENCOUNTER FOR OTHER GENERAL EXAMINATION: ICD-10-CM

## 2018-10-12 PROCEDURE — 76536 US EXAM OF HEAD AND NECK: CPT | Mod: 26

## 2018-10-12 PROCEDURE — 76536 US EXAM OF HEAD AND NECK: CPT
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
## 2019-06-13 NOTE — ED ADULT NURSE NOTE - NS ED NURSE DC TEACHING
Pt DAVID Amezquita, from home, c/o bilat lower back pain, states pain radiating down both legs, pt has hx of chronic back pain, pt's son at , states she is normally ambulatory at home, pt states she was in too much pain to walk today, pt denies dysuria, denies hx of UTI, pt usually take tylenol for pain, did not take any this morning, live at home with son, pt awake, alert, appropriate, denies recent trauma   
Orthopedic

## 2019-10-14 ENCOUNTER — APPOINTMENT (OUTPATIENT)
Dept: ULTRASOUND IMAGING | Facility: CLINIC | Age: 74
End: 2019-10-14
Payer: COMMERCIAL

## 2019-10-14 ENCOUNTER — OUTPATIENT (OUTPATIENT)
Dept: OUTPATIENT SERVICES | Facility: HOSPITAL | Age: 74
LOS: 1 days | End: 2019-10-14
Payer: COMMERCIAL

## 2019-10-14 DIAGNOSIS — Z00.8 ENCOUNTER FOR OTHER GENERAL EXAMINATION: ICD-10-CM

## 2019-10-14 PROCEDURE — 76536 US EXAM OF HEAD AND NECK: CPT

## 2019-10-14 PROCEDURE — 76536 US EXAM OF HEAD AND NECK: CPT | Mod: 26

## 2020-03-16 ENCOUNTER — APPOINTMENT (OUTPATIENT)
Dept: RADIOLOGY | Facility: CLINIC | Age: 75
End: 2020-03-16
Payer: COMMERCIAL

## 2020-03-16 ENCOUNTER — OUTPATIENT (OUTPATIENT)
Dept: OUTPATIENT SERVICES | Facility: HOSPITAL | Age: 75
LOS: 1 days | End: 2020-03-16
Payer: COMMERCIAL

## 2020-03-16 ENCOUNTER — APPOINTMENT (OUTPATIENT)
Dept: ULTRASOUND IMAGING | Facility: CLINIC | Age: 75
End: 2020-03-16
Payer: COMMERCIAL

## 2020-03-16 DIAGNOSIS — Z00.8 ENCOUNTER FOR OTHER GENERAL EXAMINATION: ICD-10-CM

## 2020-03-16 PROCEDURE — 73562 X-RAY EXAM OF KNEE 3: CPT | Mod: 26,RT

## 2020-03-16 PROCEDURE — 73562 X-RAY EXAM OF KNEE 3: CPT

## 2020-03-16 PROCEDURE — 76882 US LMTD JT/FCL EVL NVASC XTR: CPT

## 2020-03-16 PROCEDURE — 76882 US LMTD JT/FCL EVL NVASC XTR: CPT | Mod: 26,RT

## 2020-11-16 ENCOUNTER — OUTPATIENT (OUTPATIENT)
Dept: OUTPATIENT SERVICES | Facility: HOSPITAL | Age: 75
LOS: 1 days | End: 2020-11-16
Payer: COMMERCIAL

## 2020-11-16 ENCOUNTER — APPOINTMENT (OUTPATIENT)
Dept: ULTRASOUND IMAGING | Facility: CLINIC | Age: 75
End: 2020-11-16
Payer: COMMERCIAL

## 2020-11-16 DIAGNOSIS — Z00.8 ENCOUNTER FOR OTHER GENERAL EXAMINATION: ICD-10-CM

## 2020-11-16 PROCEDURE — 76536 US EXAM OF HEAD AND NECK: CPT

## 2020-11-16 PROCEDURE — 76536 US EXAM OF HEAD AND NECK: CPT | Mod: 26

## 2020-12-16 PROBLEM — Z12.11 ENCOUNTER FOR SCREENING COLONOSCOPY: Status: RESOLVED | Noted: 2018-08-20 | Resolved: 2020-12-16

## 2020-12-30 DIAGNOSIS — Z11.59 ENCOUNTER FOR SCREENING FOR OTHER VIRAL DISEASES: ICD-10-CM

## 2021-01-04 LAB — SARS-COV-2 N GENE NPH QL NAA+PROBE: NOT DETECTED

## 2021-11-19 ENCOUNTER — APPOINTMENT (OUTPATIENT)
Dept: ULTRASOUND IMAGING | Facility: CLINIC | Age: 76
End: 2021-11-19
Payer: MEDICARE

## 2021-11-19 ENCOUNTER — OUTPATIENT (OUTPATIENT)
Dept: OUTPATIENT SERVICES | Facility: HOSPITAL | Age: 76
LOS: 1 days | End: 2021-11-19
Payer: MEDICARE

## 2021-11-19 DIAGNOSIS — E04.1 NONTOXIC SINGLE THYROID NODULE: ICD-10-CM

## 2021-11-19 PROCEDURE — 76536 US EXAM OF HEAD AND NECK: CPT | Mod: 26

## 2021-11-19 PROCEDURE — 76536 US EXAM OF HEAD AND NECK: CPT

## 2021-11-21 ENCOUNTER — TRANSCRIPTION ENCOUNTER (OUTPATIENT)
Age: 76
End: 2021-11-21

## 2022-04-11 PROBLEM — Z11.59 SCREENING FOR VIRAL DISEASE: Status: ACTIVE | Noted: 2020-12-30

## 2023-05-17 NOTE — ED ADULT TRIAGE NOTE - PRO INTERPRETER NEED 2
We received a remote transmission from patient's monitor at home. Remote Linq report shows AF with RVR. EP physician to review. We will continue to monitor remotely.    Implanted for AF management. Pt is on Eliquis.    End of 31-day monitoring period 5-10-23.   English

## 2024-05-29 NOTE — ED ADULT TRIAGE NOTE - PAIN RATING/NUMBER SCALE (0-10): REST
[Nasal Endoscopy Performed] : nasal endoscopy was performed, see procedure section for findings 4 [Normal] : mucosa is normal [Midline] : trachea located in midline position [de-identified] : Left impacted wax cleaned with curette

## 2025-04-21 ENCOUNTER — NON-APPOINTMENT (OUTPATIENT)
Age: 80
End: 2025-04-21

## 2025-04-21 ENCOUNTER — APPOINTMENT (OUTPATIENT)
Dept: ULTRASOUND IMAGING | Facility: CLINIC | Age: 80
End: 2025-04-21
Payer: MEDICARE

## 2025-04-21 ENCOUNTER — OUTPATIENT (OUTPATIENT)
Dept: OUTPATIENT SERVICES | Facility: HOSPITAL | Age: 80
LOS: 1 days | End: 2025-04-21
Payer: MEDICARE

## 2025-04-21 DIAGNOSIS — M79.89 OTHER SPECIFIED SOFT TISSUE DISORDERS: ICD-10-CM

## 2025-04-21 PROCEDURE — 93970 EXTREMITY STUDY: CPT

## 2025-04-21 PROCEDURE — 93970 EXTREMITY STUDY: CPT | Mod: 26

## 2025-04-24 NOTE — ED ADULT NURSE NOTE - NS ED PATIENT SAFETY CONCERN
"Migdalia Sam [id number]   ORDERING CLINICIAN: PAYTON ALMANZAR   [id number]   ORDERING CLINICIAN: FABIAN OKEEFE   [id number]   ORDERING CLINICIAN: SOLIS BONILLA   [place] [address] Tel [phone] and Fax [phone]  Vascular Lab Report VASC US LOWER EXTREMITY VENOUS DUPLEX LEFT  Patient Name:      NITIN GALLO   Reading Physician:  [id number] Skylar Meade MD, YESY Study Date:        4/22/2025            Ordering Physician: [id number] ESTER PANDEY MRN/PID:           [id number]             Technologist:       Zoe Rodriguez RVT Accession#:        [id number]         [id number] Skylar Meade MD, YESY Electronically signed by [id number] Skylar Meade MD, YESY on 4/22/[id number]   ORDERING CLINICIAN: ESTER PANDEY   No

## 2025-05-12 ENCOUNTER — NON-APPOINTMENT (OUTPATIENT)
Age: 80
End: 2025-05-12

## 2025-05-12 ENCOUNTER — APPOINTMENT (OUTPATIENT)
Dept: VASCULAR SURGERY | Facility: CLINIC | Age: 80
End: 2025-05-12
Payer: MEDICARE

## 2025-05-12 PROCEDURE — 99204 OFFICE O/P NEW MOD 45 MIN: CPT

## 2025-05-12 PROCEDURE — 93970 EXTREMITY STUDY: CPT
